# Patient Record
Sex: FEMALE | Race: WHITE | NOT HISPANIC OR LATINO | Employment: FULL TIME
[De-identification: names, ages, dates, MRNs, and addresses within clinical notes are randomized per-mention and may not be internally consistent; named-entity substitution may affect disease eponyms.]

---

## 2017-07-22 ENCOUNTER — HEALTH MAINTENANCE LETTER (OUTPATIENT)
Age: 27
End: 2017-07-22

## 2018-07-07 ENCOUNTER — RADIANT APPOINTMENT (OUTPATIENT)
Dept: GENERAL RADIOLOGY | Facility: CLINIC | Age: 28
End: 2018-07-07
Attending: PHYSICIAN ASSISTANT
Payer: COMMERCIAL

## 2018-07-07 ENCOUNTER — OFFICE VISIT (OUTPATIENT)
Dept: URGENT CARE | Facility: URGENT CARE | Age: 28
End: 2018-07-07
Payer: COMMERCIAL

## 2018-07-07 VITALS
BODY MASS INDEX: 23.15 KG/M2 | TEMPERATURE: 98.1 F | DIASTOLIC BLOOD PRESSURE: 100 MMHG | RESPIRATION RATE: 16 BRPM | SYSTOLIC BLOOD PRESSURE: 130 MMHG | WEIGHT: 143.4 LBS | HEART RATE: 103 BPM

## 2018-07-07 DIAGNOSIS — T14.8XXA CONTUSION OF BONE: Primary | ICD-10-CM

## 2018-07-07 DIAGNOSIS — M53.3 COCCYDYNIA: ICD-10-CM

## 2018-07-07 DIAGNOSIS — T14.8XXA CONTUSION OF BONE: ICD-10-CM

## 2018-07-07 PROCEDURE — 99213 OFFICE O/P EST LOW 20 MIN: CPT | Performed by: PHYSICIAN ASSISTANT

## 2018-07-07 PROCEDURE — 72220 X-RAY EXAM SACRUM TAILBONE: CPT | Mod: FY

## 2018-07-07 NOTE — MR AVS SNAPSHOT
"              After Visit Summary   2018    Cheyenne Sosa    MRN: [id number]           Patient Information     Date Of Birth          1990        Visit Information        Provider Department      2018 10:25 AM Justin Cristina PA-C [facility]        Today's Diagnoses     Contusion of bone    -  1    Coccydynia           Follow-ups after your visit        Who to contact     If you have questions or need follow up information about today's clinic visit or your schedule please contact [facility] directly at [phone].  Normal or non-critical lab and imaging results will be communicated to you by MyChart, letter or phone within 4 business days after the clinic has received the results. If you do not hear from us within 7 days, please contact the clinic through Availendarhart or phone. If you have a critical or abnormal lab result, we will notify you by phone as soon as possible.  Submit refill requests through Webvanta or call your pharmacy and they will forward the refill request to us. Please allow 3 business days for your refill to be completed.          Additional Information About Your Visit        MyChart Information     Webvanta lets you send messages to your doctor, view your test results, renew your prescriptions, schedule appointments and more. To sign up, go to [email or web address] . Click on \"Log in\" on the left side of the screen, which will take you to the Welcome page. Then click on \"Sign up Now\" on the right side of the page.     You will be asked to enter the access code listed below, as well as some personal information. Please follow the directions to create your username and password.     Your access code is: [email or web address]  Expires: 10/16/2018  4:40 PM     Your access code will  in 90 days. If you need help or a new code, please call your [place] clinic or [phone].        Care EveryWhere ID     This is your Care " EveryWhere ID. This could be used by other organizations to access your [place] medical records  [id number]        Your Vitals Were     Pulse Temperature Respirations BMI (Body Mass Index)          103 98.1  F (36.7  C) (Oral) 16 23.15 kg/m2         Blood Pressure from Last 3 Encounters:   07/07/18 (!) 130/100   04/13/13 (!) 138/92   11/20/11 132/96    Weight from Last 3 Encounters:   07/07/18 143 lb 6.4 oz (65 kg)   04/13/13 160 lb (72.6 kg)   11/26/08 154 lb (69.9 kg) (87 %)*     * Growth percentiles are based on [facility] 2-20 Years data.               Primary Care Provider Office Phone # Fax #    Miranda Newman [phone] [address] [facility] [address]        Equal Access to Services     [facility] CenterDEBORAH : Hadii beau dengo Sorosendo, waaxda luqadaha, qaybta kaalmada adejose cyada, joyce castillo . So [facility] [phone].    ATENCIÓN: Si habla español, tiene a myers disposición servicios gratuitos de asistencia lingüística. Llame al [phone].    We comply with applicable federal civil rights laws and [place] laws. We do not discriminate on the basis of race, color, national origin, age, disability, sex, sexual orientation, or gender identity.            Thank you!     Thank you for choosing [place] [facility]  for your care. Our goal is always to provide you with excellent care. Hearing back from our patients is one way we can continue to improve our services. Please take a few minutes to complete the written survey that you may receive in the mail after your visit with us. Thank you!             Your Updated Medication List - Protect others around you: Learn how to safely use, store and throw away your medicines at www.disposemymeds.org.          This list is accurate as of 7/7/18 11:59 PM.  Always use your most recent med list.                   Brand Name Dispense Instructions for use Diagnosis    SPRINTEC 28 0.25-35 MG-MCG per tablet    Generic drug:  norgestimate-ethinyl estradiol     28    1 TABLET DAILY    Contraception

## 2018-07-07 NOTE — LETTER
[place] [facility]  [address] [address]  Phone: [phone]    July 7, 2018        Cheyenne Sosa  [address]  [facility] [address]          To whom it may concern:    RE: Cheyenne Sosa    Patient was seen and treated today at our clinic and missed work.  Patient may return to work 07/08/2018 with the following:  Light duty-unable to lift more than 10 pounds sit on cushion and protect tailbone for comfort and from further trauma.  May resume full activities on 07/11/2018.    Please contact me for questions or concerns.      Sincerely,        Justin Cristina PA-C

## 2018-07-18 NOTE — PROGRESS NOTES
SUBJECTIVE:   Cheyenne Sosa is a 28 year old female presenting with a chief complaint of coccydynia secondary to fall from a standing position.  Onset of symptoms was 2 day(s) ago.  Course of illness is same.    Severity moderate  Current and Associated symptoms: Pain with sitting  Treatment measures tried include ibuprofen 800 mg 3 times per day and ice topically.  Predisposing factors include Fall from a standing position onto her buttock.    She is denying hematochezia any ecchymoses break in the skin or bleeding no flank pain.  She has had formed bowel movements after the injury and has not had problems or difficulties.    Past Medical History:   Diagnosis Date     NONSPECIFIC MEDICAL HISTORY     Hx of anxiety     NONSPECIFIC MEDICAL HISTORY     Acne     Oppositional defiant disorder of childhood or adolescence 1/2008     Other, mixed, or unspecified nondependent drug abuse, unspecified 1/2008    methamphetamine     Unspecified episodic mood disorder 1/2008    depression - hosp at [facility]     Current Outpatient Prescriptions   Medication Sig Dispense Refill     SPRINTEC 28 0.25-35 MG-MCG OR TABS 1 TABLET DAILY (Patient not taking: No sig reported) 28 12     Social History   Substance Use Topics     Smoking status: Current Every Day Smoker     Packs/day: 1.00     Years: 1.00     Types: Cigarettes     Smokeless tobacco: Never Used      Comment: trying to cut back - started at age 13     Alcohol use No       ROS:  Review of systems negative except as stated above.    OBJECTIVE:  BP (!) 130/100  Pulse 103  Temp 98.1  F (36.7  C) (Oral)  Resp 16  Wt 143 lb 6.4 oz (65 kg)  BMI 23.15 kg/m2  GENERAL APPEARANCE: healthy, alert and no distress  NEURO: Normal strength and tone, sensory exam grossly normal,  normal speech and mentation  SKIN: no suspicious lesions or rashes  Musculoskeletal: Inspection of the buttock and sacral area does not show any ecchymoses there is no step-offs no obvious break in skin or  bleeding no edema.  She does have pain over the sacral area but not over the cervical thoracic or lumbar spinal processes.    Images:    Sacral xrays: Show no x-rays or fractures.  This mild personal interpretation radiologist overread films    ASSESSMENT:  (T14.8XXA) Contusion of bone  (primary encounter diagnosis)  (M53.3) Coccydynia    PLAN:    ASSESSMENT:  1. Contusion of bone    2. Coccydynia        PLAN:  Orders Placed This Encounter     XR Sacrum and Coccyx 2 Views     Brief office intervention and patient education handout from [employer] on sacral injury and coccydynia  Actually use soft cushion seating, ice topically 3 times per day stool softeners and increase fiber increase fluids to keep stools soft and formed.  She will follow-up with the primary care provider for reevaluation and treatment.  Note for work for restrictions until she is able to resume full duties.  Indication for return to clinic was gone over patient voiced understanding.

## 2018-12-03 ENCOUNTER — RADIANT APPOINTMENT (OUTPATIENT)
Dept: GENERAL RADIOLOGY | Facility: CLINIC | Age: 28
End: 2018-12-03
Attending: FAMILY MEDICINE
Payer: COMMERCIAL

## 2018-12-03 ENCOUNTER — OFFICE VISIT (OUTPATIENT)
Dept: URGENT CARE | Facility: URGENT CARE | Age: 28
End: 2018-12-03
Payer: COMMERCIAL

## 2018-12-03 VITALS
HEART RATE: 104 BPM | WEIGHT: 149.1 LBS | RESPIRATION RATE: 16 BRPM | BODY MASS INDEX: 24.07 KG/M2 | OXYGEN SATURATION: 100 %

## 2018-12-03 DIAGNOSIS — R11.2 NAUSEA AND VOMITING, INTRACTABILITY OF VOMITING NOT SPECIFIED, UNSPECIFIED VOMITING TYPE: ICD-10-CM

## 2018-12-03 DIAGNOSIS — M54.9 UPPER BACK PAIN: ICD-10-CM

## 2018-12-03 DIAGNOSIS — M54.2 NECK PAIN: Primary | ICD-10-CM

## 2018-12-03 PROCEDURE — 72040 X-RAY EXAM NECK SPINE 2-3 VW: CPT | Mod: FY

## 2018-12-03 PROCEDURE — 99214 OFFICE O/P EST MOD 30 MIN: CPT | Mod: 25 | Performed by: FAMILY MEDICINE

## 2018-12-03 PROCEDURE — 96372 THER/PROPH/DIAG INJ SC/IM: CPT | Performed by: FAMILY MEDICINE

## 2018-12-03 RX ORDER — NAPROXEN 500 MG/1
500 TABLET ORAL 2 TIMES DAILY WITH MEALS
Qty: 14 TABLET | Refills: 0 | Status: SHIPPED | OUTPATIENT
Start: 2018-12-03 | End: 2018-12-10

## 2018-12-03 RX ORDER — KETOROLAC TROMETHAMINE 30 MG/ML
60 INJECTION, SOLUTION INTRAMUSCULAR; INTRAVENOUS ONCE
Qty: 2 ML | Refills: 0 | OUTPATIENT
Start: 2018-12-03 | End: 2018-12-03

## 2018-12-03 RX ORDER — HYDROCODONE BITARTRATE AND ACETAMINOPHEN 5; 325 MG/1; MG/1
1 TABLET ORAL EVERY 6 HOURS PRN
Qty: 6 TABLET | Refills: 0 | Status: SHIPPED | OUTPATIENT
Start: 2018-12-03 | End: 2018-12-05

## 2018-12-03 RX ORDER — METHOCARBAMOL 750 MG/1
750 TABLET, FILM COATED ORAL 4 TIMES DAILY
Qty: 28 TABLET | Refills: 0 | Status: SHIPPED | OUTPATIENT
Start: 2018-12-03 | End: 2018-12-10

## 2018-12-03 NOTE — MR AVS SNAPSHOT
After Visit Summary   12/3/2018    Cheyenne Sosa    MRN: [id number]           Patient Information     Date Of Birth          1990        Visit Information        Provider Department      12/3/2018 10:40 AM Andrade Singh DO [facility]        Today's Diagnoses     Neck pain    -  1    Upper back pain        Nausea and vomiting, intractability of vomiting not specified, unspecified vomiting type           Follow-ups after your visit        Additional Services     [facility] PT, HAND, AND CHIROPRACTIC REFERRAL       Physical Therapy, Hand Therapy and Chiropractic Care are available through:  *[facility]  *Hand Therapy (Occupational Therapy or Physical Therapy)  *[facility]    Call one number to schedule at any of the above locations: [phone].    Physical therapy, Hand therapy and/or Chiropractic care has been recommended by your physician as an excellent treatment option to reduce pain and help people return to normal activities, including sports.  Therapy and/or chiropractic care services are a great complement or alternative to expensive and invasive surgery, injections, or long-term use of prescription medications. The primary goal is to identify the underlying problem and provide you the tools to manage your condition on your own.     Please be aware that coverage of these services is subject to the terms and limitations of your health insurance plan.  Call member services at your health plan with any benefit or coverage questions.      Please bring the following to your appointment:  *Your personal calendar for scheduling future appointments  *Comfortable clothing                  Future tests that were ordered for you today     Open Future Orders        Priority Expected Expires Ordered    [facility] PT, HAND, AND CHIROPRACTIC REFERRAL Routine  12/3/2019 12/3/2018            Who to contact     If you have questions or  "need follow up information about today's clinic visit or your schedule please contact [place] [facility] directly at [phone].  Normal or non-critical lab and imaging results will be communicated to you by MyChart, letter or phone within 4 business days after the clinic has received the results. If you do not hear from us within 7 days, please contact the clinic through Pretio Interactivehart or phone. If you have a critical or abnormal lab result, we will notify you by phone as soon as possible.  Submit refill requests through Mobilitus or call your pharmacy and they will forward the refill request to us. Please allow 3 business days for your refill to be completed.          Additional Information About Your Visit        Pretio InteractiveharBuildMyMove Information     Mobilitus lets you send messages to your doctor, view your test results, renew your prescriptions, schedule appointments and more. To sign up, go to [email or web address] . Click on \"Log in\" on the left side of the screen, which will take you to the Welcome page. Then click on \"Sign up Now\" on the right side of the page.     You will be asked to enter the access code listed below, as well as some personal information. Please follow the directions to create your username and password.     Your access code is: [email or web address]  Expires: 3/3/2019 11:41 AM     Your access code will  in 90 days. If you need help or a new code, please call your [place] clinic or [phone].        Care EveryWhere ID     This is your Care EveryWhere ID. This could be used by other organizations to access your [place] medical records  [id number]        Your Vitals Were     Pulse Respirations Pulse Oximetry BMI (Body Mass Index)          104 16 100% 24.07 kg/m2         Blood Pressure from Last 3 Encounters:   18 (!) 130/100   13 (!) 138/92   11 132/96    Weight from Last 3 Encounters:   18 149 lb 1.6 oz (67.6 kg)   18 143 lb 6.4 oz (65 kg)   13 160 " lb (72.6 kg)              We Performed the Following     INJECTION INTRAMUSCULAR OR SUB-Q     KETOROLAC TROMETHAMINE 15MG     XR Cervical Spine 2/3 Views          Today's Medication Changes          These changes are accurate as of 12/3/18 11:41 AM.  If you have any questions, ask your nurse or doctor.               Start taking these medicines.        Dose/Directions    HYDROcodone-acetaminophen 5-325 MG tablet   Commonly known as:  NORCO   Used for:  Neck pain, Upper back pain   Started by:  Andrade Singh DO        Dose:  1 tablet   Take 1 tablet by mouth every 6 hours as needed for severe pain   Quantity:  6 tablet   Refills:  0       ketorolac 60 MG/2ML injection   Commonly known as:  TORADOL   Used for:  Neck pain, Upper back pain, Nausea and vomiting, intractability of vomiting not specified, unspecified vomiting type   Started by:  Andrade Singh DO        Dose:  60 mg   Inject 2 mLs (60 mg) into the muscle once for 1 dose   Quantity:  2 mL   Refills:  0       methocarbamol 750 MG tablet   Commonly known as:  ROBAXIN   Used for:  Neck pain, Upper back pain   Started by:  Andrade Singh DO        Dose:  750 mg   Take 1 tablet (750 mg) by mouth 4 times daily for 7 days   Quantity:  28 tablet   Refills:  0       naproxen 500 MG tablet   Commonly known as:  NAPROSYN   Used for:  Neck pain, Upper back pain   Started by:  Andrade Singh DO        Dose:  500 mg   Take 1 tablet (500 mg) by mouth 2 times daily (with meals) for 7 days   Quantity:  14 tablet   Refills:  0            Where to get your medicines      These medications were sent to [facility] [address] [place] [address] [address] AT [facility] [address], [facility] [address]     Phone:  [phone]     methocarbamol 750 MG tablet    naproxen 500 MG tablet         Some of these will need a paper prescription and others can be bought over the counter.  Ask your nurse if you have questions.     Bring a  paper prescription for each of these medications     HYDROcodone-acetaminophen 5-325 MG tablet       You don't need a prescription for these medications     ketorolac 60 MG/2ML injection               Information about OPIOIDS     PRESCRIPTION OPIOIDS: WHAT YOU NEED TO KNOW   We gave you an opioid (narcotic) pain medicine. It is important to manage your pain, but opioids are not always the best choice. You should first try all the other options your care team gave you. Take this medicine for as short a time (and as few doses) as possible.    Some activities can increase your pain, such as bandage changes or therapy sessions. It may help to take your pain medicine 30 to 60 minutes before these activities. Reduce your stress by getting enough sleep, working on hobbies you enjoy and practicing relaxation or meditation. Talk to your care team about ways to manage your pain beyond prescription opioids.    These medicines have risks:    DO NOT drive when on new or higher doses of pain medicine. These medicines can affect your alertness and reaction times, and you could be arrested for driving under the influence (DUI). If you need to use opioids long-term, talk to your care team about driving.    DO NOT operate heavy machinery    DO NOT do any other dangerous activities while taking these medicines.    DO NOT drink any alcohol while taking these medicines.     If the opioid prescribed includes acetaminophen, DO NOT take with any other medicines that contain acetaminophen. Read all labels carefully. Look for the word  acetaminophen  or  Tylenol.  Ask your pharmacist if you have questions or are unsure.    You can get addicted to pain medicines, especially if you have a history of addiction (chemical, alcohol or substance dependence). Talk to your care team about ways to reduce this risk.    All opioids tend to cause constipation. Drink plenty of water and eat foods that have a lot of fiber, such as fruits, vegetables,  prune juice, apple juice and high-fiber cereal. Take a laxative (Miralax, milk of magnesia, Colace, Senna) if you don t move your bowels at least every other day. Other side effects include upset stomach, sleepiness, dizziness, throwing up, tolerance (needing more of the medicine to have the same effect), physical dependence and slowed breathing.    Store your pills in a secure place, locked if possible. We will not replace any lost or stolen medicine. If you don t finish your medicine, please throw away (dispose) as directed by your pharmacist. The [place] Pollution Control Agency has more information about safe disposal: [email or web address]         Primary Care Provider Office Phone # Fax #    Miranda Newman [phone] [address] [facility] [address]        Equal Access to Services     [facility] : Hadii aad ku hadasho Sorosendo, waaxda luqadaha, qaybta kaalmada adeegyada, joyce castillo . So [facility] [phone].    ATENCIÓN: Si habla español, tiene a myers disposición servicios gratuitos de asistencia lingüística. Llame al [phone].    We comply with applicable federal civil rights laws and [place] laws. We do not discriminate on the basis of race, color, national origin, age, disability, sex, sexual orientation, or gender identity.            Thank you!     Thank you for choosing [place] [facility]  for your care. Our goal is always to provide you with excellent care. Hearing back from our patients is one way we can continue to improve our services. Please take a few minutes to complete the written survey that you may receive in the mail after your visit with us. Thank you!             Your Updated Medication List - Protect others around you: Learn how to safely use, store and throw away your medicines at www.disposemymeds.org.          This list is accurate as of 12/3/18 11:41 AM.   Always use your most recent med list.                   Brand Name Dispense Instructions for use Diagnosis    HYDROcodone-acetaminophen 5-325 MG tablet    NORCO    6 tablet    Take 1 tablet by mouth every 6 hours as needed for severe pain    Neck pain, Upper back pain       ketorolac 60 MG/2ML injection    TORADOL    2 mL    Inject 2 mLs (60 mg) into the muscle once for 1 dose    Neck pain, Upper back pain, Nausea and vomiting, intractability of vomiting not specified, unspecified vomiting type       methocarbamol 750 MG tablet    ROBAXIN    28 tablet    Take 1 tablet (750 mg) by mouth 4 times daily for 7 days    Neck pain, Upper back pain       naproxen 500 MG tablet    NAPROSYN    14 tablet    Take 1 tablet (500 mg) by mouth 2 times daily (with meals) for 7 days    Neck pain, Upper back pain       SPRINTEC 28 0.25-35 MG-MCG tablet   Generic drug:  norgestimate-ethinyl estradiol     28    1 TABLET DAILY    Contraception

## 2018-12-03 NOTE — PROGRESS NOTES
SUBJECTIVE:  Chief Complaint   Patient presents with     Urgent Care     Pt states back pain sxs 2x days    .ident who presents with a chief complaint of  bilateral back and neck pain.  Symptoms began day(s) ago , are moderate andworsening.  Context:Injury: no  Pain exacerbated by movement   Relieved by nothing   She treated it initially with OTC/heat.   This is the first time this type of injury has occurred to this patient.     Past Medical History:   Diagnosis Date     NONSPECIFIC MEDICAL HISTORY     Hx of anxiety     NONSPECIFIC MEDICAL HISTORY     Acne     Oppositional defiant disorder of childhood or adolescence 1/2008     Other, mixed, or unspecified nondependent drug abuse, unspecified 1/2008    methamphetamine     Unspecified episodic mood disorder 1/2008    depression - hosp at [facility]       Past Surgical History:   Procedure Laterality Date     NO HISTORY OF SURGERY         Family History   Problem Relation Age of Onset     Depression Mother      Family History Negative Father      Diabetes Maternal Grandmother      Cancer Maternal Grandfather      Lung       Social History   Substance Use Topics     Smoking status: Current Every Day Smoker     Packs/day: 1.00     Years: 1.00     Types: Cigarettes     Smokeless tobacco: Never Used      Comment: trying to cut back - started at age 13     Alcohol use No      No Known Allergies    ROS:CONSTITUTIONAL:NEGATIVE for fever, chills, change in weight  INTEGUMENTARY/SKIN: NEGATIVE for worrisome rashes, moles or lesions  EYES: NEGATIVE for vision changes or irritation  ENT/MOUTH: NEGATIVE for ear, mouth and throat problems  RESP:NEGATIVE for significant cough or SOB  CV: NEGATIVE for chest pain, palpitations or peripheral edema  GI: NEGATIVE for nausea, abdominal pain, heartburn, or change in bowel habits  : negative for and hematuria  MUSCULOSKELETAL: as above  NEURO: NEGATIVE for weakness, dizziness or paresthesias    EXAM: Pulse 104  Resp 16  Wt 149 lb 1.6 oz  (67.6 kg)  SpO2 100%  BMI 24.07 kg/m2  Exam:upper back and neck  tenderness to palpation and pain with rom  GENERAL APPEARANCE: healthy, alert and no distress  NECK:  decreased ROM , no spinous tenderness to palpation, no adenopathy and no asymmetry, masses, or scars  CHEST: clear to auscultation  CV: regular rate and rhythm  EXTREMITIES: peripheral pulses normal  MS: no gross deformities noted, no evidence of inflammation in joints, FROM in all extremities.  SKIN: no suspicious lesions or rashes  NEURO: Normal strength and tone, sensory exam grossly normal, mentation intact and speech normal    X-RAY with straightening of c spine, read by dr. Andrade Singh    ASSESSMENT:     ICD-10-CM    1. Neck pain M54.2 ketorolac (TORADOL) 60 MG/2ML injection     XR Cervical Spine 2/3 Views     methocarbamol (ROBAXIN) 750 MG tablet     naproxen (NAPROSYN) 500 MG tablet     HYDROcodone-acetaminophen (NORCO) 5-325 MG tablet     [facility] PT, HAND, AND CHIROPRACTIC REFERRAL   2. Upper back pain M54.9 ketorolac (TORADOL) 60 MG/2ML injection     XR Cervical Spine 2/3 Views     KETOROLAC TROMETHAMINE 15MG     INJECTION INTRAMUSCULAR OR SUB-Q     methocarbamol (ROBAXIN) 750 MG tablet     naproxen (NAPROSYN) 500 MG tablet     HYDROcodone-acetaminophen (NORCO) 5-325 MG tablet     [facility] PT, HAND, AND CHIROPRACTIC REFERRAL   3. Nausea and vomiting, intractability of vomiting not specified, unspecified vomiting type R11.2 ketorolac (TORADOL) 60 MG/2ML injection     XR Cervical Spine 2/3 Views     Cont heat